# Patient Record
Sex: MALE | Race: WHITE | NOT HISPANIC OR LATINO | URBAN - NONMETROPOLITAN AREA
[De-identification: names, ages, dates, MRNs, and addresses within clinical notes are randomized per-mention and may not be internally consistent; named-entity substitution may affect disease eponyms.]

---

## 2017-01-28 ENCOUNTER — OFFICE VISIT (OUTPATIENT)
Dept: RETAIL CLINIC | Facility: CLINIC | Age: 52
End: 2017-01-28

## 2017-01-28 VITALS
SYSTOLIC BLOOD PRESSURE: 142 MMHG | DIASTOLIC BLOOD PRESSURE: 88 MMHG | RESPIRATION RATE: 20 BRPM | TEMPERATURE: 99.2 F | HEART RATE: 84 BPM | OXYGEN SATURATION: 97 %

## 2017-01-28 DIAGNOSIS — J06.9 ACUTE URI: Primary | ICD-10-CM

## 2017-01-28 LAB
EXPIRATION DATE: NORMAL
FLUAV AG NPH QL: NEGATIVE
FLUBV AG NPH QL: NEGATIVE
INTERNAL CONTROL: NORMAL
Lab: NORMAL

## 2017-01-28 PROCEDURE — 87804 INFLUENZA ASSAY W/OPTIC: CPT | Performed by: NURSE PRACTITIONER

## 2017-01-28 PROCEDURE — 99203 OFFICE O/P NEW LOW 30 MIN: CPT | Performed by: NURSE PRACTITIONER

## 2017-01-28 RX ORDER — PREDNISONE 20 MG/1
20 TABLET ORAL 2 TIMES DAILY
Qty: 10 TABLET | Refills: 0 | Status: SHIPPED | OUTPATIENT
Start: 2017-01-28

## 2017-01-28 RX ORDER — OMEPRAZOLE 20 MG/1
20 CAPSULE, DELAYED RELEASE ORAL DAILY
COMMUNITY

## 2017-01-28 NOTE — MR AVS SNAPSHOT
Gutierrez Case   1/28/2017 2:45 PM   Office Visit    Dept Phone:  [phone]   Encounter #:  [id number]    Provider:  PROVIDER BEC SOMERSET   Department:  [facility]                Your Full Care Plan              Today's Medication Changes          These changes are accurate as of: 1/28/17  3:12 PM.  If you have any questions, ask your nurse or doctor.               New Medication(s)Ordered:     predniSONE 20 MG tablet   Commonly known as:  DELTASONE   Take 1 tablet by mouth 2 (Two) Times a Day.            Where to Get Your Medications      These medications were sent to [facility] [address] [place] [address] - [address] [place] [address] - [phone] [facility] [phone] [address] [address]     Phone:  [phone]     predniSONE 20 MG tablet                  Your Updated Medication List          This list is accurate as of: 1/28/17  3:12 PM.  Always use your most recent med list.                predniSONE 20 MG tablet   Commonly known as:  DELTASONE   Take 1 tablet by mouth 2 (Two) Times a Day.               You Were Diagnosed With        Codes Comments    Acute URI    -  Primary ICD-10-CM: J06.9  ICD-9-CM: 465.9       Instructions    Antibiotics will not treat a viral illness, such as a cold or viral URI. Your symptoms should resolve over a course of 7-10 days with symptomatic treatment.   If no cough meds were prescribed today, you may Mucinex DM, over the counter, as per label instructions for cough. Tylenol or ibuprofen per directions for aches/fever;   Push fluids and rest; Gargle with warm salt water several times daily for throat irritation;   Vicks rub to chest may help relieve congestion;   Cool mist humidifier may also help relieve congestion;   Cover cough and use good handwashing, often, to help prevent spread of illness to close contacts;   F/U with PCP for persistent or worsening symptoms;            Patient Instructions History      Upcoming  Appointments     Visit Type Date Time Department    NEW PATIENT 2017  2:45 PM [facility] B[facility] SOMEDEE      [email or web address] Signup     [facility] [email or web address] allows you to send messages to your doctor, view your test results, renew your prescriptions, schedule appointments, and more. To sign up, go to [employer] and click on the Sign Up Now link in the New User? box. Enter your [email or web address] Activation Code exactly as it appears below along with the last four digits of your Social Security Number and your Date of Birth () to complete the sign-up process. If you do not sign up before the expiration date, you must request a new code.    [email or web address] Activation Code: [email or web address]  Expires: 2017  3:12 PM    If you have questions, you can email [email or web address] or call [phone] to talk to our [email or web address] staff. Remember, [email or web address] is NOT to be used for urgent needs. For medical emergencies, dial 911.               Other Info from Your Visit           Allergies     Not on File      Reason for Visit     URI           Problems and Diagnoses Noted     Acute upper respiratory infection    -  Primary

## 2017-01-28 NOTE — PROGRESS NOTES
Subjective   Gutierrez Case is a 51 y.o. male.   Chief Complaint   Patient presents with   • URI      HPI Comments: Did not have flu vaccine and is concerned about flu      URI    This is a new problem. The current episode started in the past 7 days (2 days). The problem has been gradually worsening. Maximum temperature: unsure. Associated symptoms include congestion, coughing, headaches, rhinorrhea, sneezing and a sore throat. Pertinent negatives include no abdominal pain, chest pain, diarrhea, ear pain, nausea, rash, vomiting or wheezing. He has tried decongestant, antihistamine, NSAIDs and sleep for the symptoms. The treatment provided mild relief.        The following portions of the patient's history were reviewed and updated as appropriate: allergies, current medications, past family history, past medical history, past social history, past surgical history and problem list.    Current Outpatient Prescriptions:   •  omeprazole (priLOSEC) 20 MG capsule, Take 20 mg by mouth Daily., Disp: , Rfl:   •  predniSONE (DELTASONE) 20 MG tablet, Take 1 tablet by mouth 2 (Two) Times a Day., Disp: 10 tablet, Rfl: 0    Review of Systems   Constitutional: Positive for appetite change (slightly decreased) and chills (and body aches). Negative for fatigue and fever.        No body aches   HENT: Positive for congestion, postnasal drip, rhinorrhea, sinus pressure, sneezing, sore throat and voice change. Negative for ear pain, facial swelling and mouth sores. Ear discharge: pressure.    Eyes: Negative for discharge.   Respiratory: Positive for cough and chest tightness. Negative for shortness of breath and wheezing.    Cardiovascular: Negative for chest pain.   Gastrointestinal: Negative for abdominal pain, diarrhea, nausea and vomiting.   Skin: Negative for rash.   Neurological: Positive for headaches. Negative for dizziness and light-headedness.     Visit Vitals   • /88   • Pulse 84   • Temp 99.2 °F (37.3 °C)   • Resp 20    • SpO2 97%       Objective   No Known Allergies    Physical Exam   Constitutional: He is oriented to person, place, and time. He appears well-developed and well-nourished. He appears ill (mild). No distress.   HENT:   Head: Normocephalic and atraumatic.   Right Ear: External ear normal. Tympanic membrane is not erythematous. A middle ear effusion is present.   Left Ear: External ear normal. Tympanic membrane is not erythematous. A middle ear effusion is present.   Nose: Rhinorrhea present. Right sinus exhibits no maxillary sinus tenderness and no frontal sinus tenderness. Left sinus exhibits no maxillary sinus tenderness and no frontal sinus tenderness.   Mouth/Throat: Posterior oropharyngeal erythema (Mild with clear post nasal drip) present. No oropharyngeal exudate or posterior oropharyngeal edema.   Bilateral TM with mild serous effusion.    Significant nasal congestion.   Eyes: Conjunctivae, EOM and lids are normal.   Neck: Full passive range of motion without pain.   Cardiovascular: Normal rate and regular rhythm.    Pulmonary/Chest: Effort normal. No respiratory distress. He has wheezes (moderate wheeze, no rhonchi).   Abdominal: Soft. Normal appearance and bowel sounds are normal. There is no tenderness.   Lymphadenopathy:        Head (right side): No tonsillar adenopathy present.        Head (left side): No tonsillar adenopathy present.     He has no cervical adenopathy.   Neurological: He is alert and oriented to person, place, and time.   Skin: Skin is warm and dry. No rash noted.       Assessment/Plan   Gutierrez was seen today for uri.    Diagnoses and all orders for this visit:    Acute URI  -     POC Influenza A / B    Other orders  -     predniSONE (DELTASONE) 20 MG tablet; Take 1 tablet by mouth 2 (Two) Times a Day.      Lab Results   Component Value Date    RAPFLUA negative 01/28/2017    RAPFLUB negative 01/28/2017

## 2017-01-28 NOTE — PATIENT INSTRUCTIONS
Antibiotics will not treat a viral illness, such as a cold or viral URI. Your symptoms should resolve over a course of 7-10 days with symptomatic treatment.   If no cough meds were prescribed today, you may Mucinex DM, over the counter, as per label instructions for cough. Tylenol or ibuprofen per directions for aches/fever;   Push fluids and rest; Gargle with warm salt water several times daily for throat irritation;   Vicks rub to chest may help relieve congestion;   Cool mist humidifier may also help relieve congestion;   Cover cough and use good handwashing, often, to help prevent spread of illness to close contacts;   F/U with PCP for persistent or worsening symptoms;